# Patient Record
Sex: MALE | Race: WHITE
[De-identification: names, ages, dates, MRNs, and addresses within clinical notes are randomized per-mention and may not be internally consistent; named-entity substitution may affect disease eponyms.]

---

## 2018-01-13 ENCOUNTER — HOSPITAL ENCOUNTER (EMERGENCY)
Dept: HOSPITAL 25 - UCEAST | Age: 48
Discharge: HOME | End: 2018-01-13
Payer: COMMERCIAL

## 2018-01-13 VITALS — SYSTOLIC BLOOD PRESSURE: 156 MMHG | DIASTOLIC BLOOD PRESSURE: 91 MMHG

## 2018-01-13 DIAGNOSIS — I10: ICD-10-CM

## 2018-01-13 DIAGNOSIS — M25.561: Primary | ICD-10-CM

## 2018-01-13 DIAGNOSIS — R29.898: ICD-10-CM

## 2018-01-13 DIAGNOSIS — F17.210: ICD-10-CM

## 2018-01-13 DIAGNOSIS — M25.461: ICD-10-CM

## 2018-01-13 PROCEDURE — 99212 OFFICE O/P EST SF 10 MIN: CPT

## 2018-01-13 PROCEDURE — G0463 HOSPITAL OUTPT CLINIC VISIT: HCPCS

## 2018-01-13 NOTE — RAD
Indication: Right knee pain.



4 views of the right knee demonstrates no fracture. There are mild degenerative changes of

both the medial and lateral compartment. There is suggestion of a suprapatellar effusion.



IMPRESSION: Suprapatellar effusion with degenerative changes of both knees.

## 2018-01-13 NOTE — UC
Deepak GUTIERRES Nilda, scribed for Massimo Parker MD on 01/13/18 at 1906 .





Knee Pain HPI





- HPI Summary


HPI Summary: 


This patient is a 47 year old M presenting to [facility] with a chief complaint of 

constant right knee pain since twisting it while sleeping last night. The 

patient rates the aching pain 8/10 in severity. Symptoms aggravated by walking 

and bending. Symptoms alleviated by rest and ibuprofen. Patient reports right 

calf pain and decreased knee ROM secondary to pain. He states that he 

occasionally feels like knee may give out on him. Pt notes that when he keeps 

his leg straight, he occasionally feels pain travel to right foot.  Two weeks 

ago pt twisted right knee after getting out of bed. No PMHx blood clots. PSHx 

surgery on his left knee.





- History of Current Complaint


Chief Complaint: UCLowerExtremity


Stated Complaint: KNEE INJURY


Time Seen by Provider: 01/13/18 18:30


Hx Obtained From: Patient


Onset/Duration: Sudden Onset, Lasting Days, Still Present


Severity Currently: Severe


Location Of Injury: right knee


Pain Intensity: 8


Pain Scale Used: 0-10 Numeric


Character: Aching


Aggravating Factor(s): Movement, Other - bending


Alleviating Factor(s): Rest, OTC Meds - ibuprofen





- Allergies/Home Medications


Allergies/Adverse Reactions: 


 Allergies











Allergy/AdvReac Type Severity Reaction Status Date / Time


 


CI Pigment Blue 63 Allergy Severe swelling/it Verified 01/13/18 17:59





[From Dexilant]   nichole  


 


Dexlansoprazole Allergy Severe swelling/it Verified 01/13/18 17:59





[From Dexilant]   nichole  











Home Medications: 


 Home Medications





Metformin HCl [Fortamet]  01/13/18 [History]


Metoprolol Succinate [Metoprolol Succinate ER]  01/13/18 [History]


Nicotine PATCH 21 MG/24 HR*  01/13/18 [History]











PMH/Surg Hx/FS Hx/Imm Hx





- Additional Past Medical History


Additional PMH: 


Arthritis





Cardiovascular History: Hypertension


GI/ History: Ulcer





- Surgical History


Surgical History: Yes


Surgery Procedure, Year, and Place: TRIPLE HERNIA REPAIR--APRIL 2012 PT HAD 

MESH REACTION.  LEFT KNEE SURGERY NOV 2015





- Family History


Known Family History: Positive: Cardiac Disease





- Social History


Alcohol Use: None


Alcohol Amount: ONCE A MONTH


Substance Use Type: None


Smoking Status (MU): Heavy Every Day Tobacco Smoker


Type: Cigarettes


Amount Used/How Often: one pack x 30 years


Have You Smoked in the Last Year: Yes


Household Exposure Type: Cigarettes





- Immunization History


Most Recent Influenza Vaccination: unknown


Most Recent Tetanus Shot: unsure


Most Recent Pneumonia Vaccination: never





Review of Systems


Skin: Other - psoriasis


Musculoskeletal: Decreased ROM, Other: - right knee pain; right foot pain upon 

extending right leg for too long


All Other Systems Reviewed And Are Negative: Yes





Physical Exam


Triage Information Reviewed: Yes


Vital Signs: 


 Initial Vital Signs











Temp  98.6 F   01/13/18 17:53


 


Pulse  81   01/13/18 17:53


 


Resp  14   01/13/18 17:53


 


BP  156/91   01/13/18 17:53


 


Pulse Ox  98   01/13/18 17:53











Vital Signs Reviewed: Yes





- Additional Comments


General: well-appearing, no pain distress


Skin: warm, color reflects adequate perfusion, dry, severe psoriasis


Head: normal


Eyes: EOMI, DINH


ENT: normal


Neck: supple, nontender


Respiratory: CTA, breath sounds present


Cardiovascular: RRR


Abdomen: soft, nontender


Bowel: present


Musculoskeletal: Right knee effusion stable to exam, Tender over the medial 

aspect of right knee, popliteal, and patella. Not tender laterally. No 

tenderness to calf or upper inner thigh. + Edema bilat.


Neurological: normal, sensory/motor intact, A&O x3


Psychological: affect/mood appropriate





Diagnostics





- Radiology


  ** R Knee XR


Radiology Interpretation Completed By: Radiologist - Right knee XR, per 

radiologist, reveals suprapatellar effusion with degenerative changes of both 

knees. Dr. Parker has reviewed this radiology report.





Re-Evaluation





- Re-Evaluation


  ** First Eval


Re-Evaluation Time: 19:15


Comment: Reviewed imaging results with pt. Pt agreeable to discharge.





Knee Pain Course/Dx





- Course


Course Of Treatment: Knee XR, per radiologist, reveals suprapatellar effusion 

with degenerative changes of both knees. Dr. Parker has reviewed this 

radiology report.  Allergies noted.  Medications reviewed.  BP noted and 

advised to follow up with PCP.  DISCUSSED RESULTS WITH PATIENT.  PATIENT HAS 

CHRONIC EDEMA. WE DISCUSSED THE POSSIBLITY OF DVT; THIS IS UNLIKELY AT THIS 

TIME GIVEN HIS HX AND EXAM. THE PAIN ORIGINATES AT THE SWOLLEN KNEE. HE WILL 

SEEK RE EVAL IF ANY S/SX OF DVT.  F/U PMD; RETURN IF WORSE OR QUESTIONS/

CONCERNS.





- Differential Dx/Diagnosis


Provider Diagnoses: RIGHT KNEE EFFUSION AND PAIN.  uncontrolled hypertension





Discharge





- Discharge Plan


Condition: Stable


Disposition: HOME


Prescriptions: 


HYDROcodone/ACETAMIN 5-325 MG* [Norco 5-325 TAB*] 1 tab PO Q4H PRN #30 tab MDD 6


 PRN Reason: Pain


Patient Education Materials:  Swollen Knee Joint (ED), Knee Pain (ED)


Referrals: 


Chen Mendoza [Primary Care Provider] - 


Additional Instructions: 


FOLLOW UP WITH YOUR DOCTOR.


YOUR PAIN APPEARS TO BE FROM YOUR KNEE INJURY.


WE DID NOT DO AN ULTRASOUND TO EVALUATE YOUR LEG FOR A DEEP VENOUS THROMBOSIS (

DVT). IT DOES NOT APPEAR THAT YOU HAVE A DVT AT THIS TIME HOWEVER, IF YOUR LEG 

WORSENS WITH CALF OR UPPER INNER THIGH SWELLING OR PAIN, GET CHECKED FOR A DVT.


GET RECHECKED FOR ANY WORSENING OF YOUR CONDITION; CALF OR UPPER INNER THIGH 

PAIN, OR QUESTIONS OR CONCERNS. Your blood pressure was elevated during todays 

visit; please follow up with your primary care provider within a week for 

further evaluation. 





The documentation as recorded by the Deepak wright Nilda accurately 

reflects the service I personally performed and the decisions made by me, 

Massimo Parker MD.

## 2019-12-07 ENCOUNTER — HOSPITAL ENCOUNTER (EMERGENCY)
Dept: HOSPITAL 25 - UCCORT | Age: 49
Discharge: HOME HEALTH | End: 2019-12-07
Payer: SELF-PAY

## 2019-12-07 VITALS — SYSTOLIC BLOOD PRESSURE: 131 MMHG | DIASTOLIC BLOOD PRESSURE: 85 MMHG

## 2019-12-07 DIAGNOSIS — L30.8: ICD-10-CM

## 2019-12-07 DIAGNOSIS — M79.662: Primary | ICD-10-CM

## 2019-12-07 DIAGNOSIS — I10: ICD-10-CM

## 2019-12-07 DIAGNOSIS — Z88.8: ICD-10-CM

## 2019-12-07 DIAGNOSIS — R00.0: ICD-10-CM

## 2019-12-07 DIAGNOSIS — F17.210: ICD-10-CM

## 2019-12-07 DIAGNOSIS — E11.9: ICD-10-CM

## 2019-12-07 PROCEDURE — 99212 OFFICE O/P EST SF 10 MIN: CPT

## 2019-12-07 PROCEDURE — G0463 HOSPITAL OUTPT CLINIC VISIT: HCPCS

## 2019-12-07 NOTE — UC
Lower Extremity/Ankle HPI





- History of Current Complaint


Chief Complaint: UCLowerExtremity


Stated Complaint: LEFT CALF COMPLAINT


Time Seen by Provider: 12/07/19 09:31


Pain Intensity: 11





- Allergies/Home Medications


Allergies/Adverse Reactions: 


 Allergies











Allergy/AdvReac Type Severity Reaction Status Date / Time


 


dexlansoprazole Allergy  Itching Verified 11/14/18 13:17





[From Dexilant]   and  





   Swelling  











Home Medications: 


 Home Medications





NK [No Home Medications Reported]  12/07/19 [History Confirmed 12/07/19]











PMH/Surg Hx/FS Hx/Imm Hx





- Surgical History


Surgical History: Yes


Surgery Procedure, Year, and Place: TRIPLE HERNIA REPAIR--APRIL 2012 PT HAD 

MESH REACTION.  LEFT KNEE SURGERY NOV 2015





- Family History


Known Family History: Positive: None, Cardiac Disease





- Social History


Alcohol Use: Occasionally


Alcohol Amount: ONCE A MONTH


Substance Use Type: None


Smoking Status (MU): Heavy Every Day Tobacco Smoker


Type: Cigarettes


Amount Used/How Often: 1 PPD


Length of Time of Smoking/Using Tobacco: Since Age 12


Have You Smoked in the Last Year: Yes


Household Exposure Type: Cigarettes





- Immunization History


Most Recent Influenza Vaccination: unknown


Most Recent Tetanus Shot: unsure


Most Recent Pneumonia Vaccination: never


Vaccination Up to Date: Yes





Physical Exam


Vital Signs: 


 Initial Vital Signs











Temp  98.1 F   12/07/19 08:48


 


Pulse  100   12/07/19 08:48


 


Resp  18   12/07/19 08:48


 


BP  131/85   12/07/19 08:48


 


Pulse Ox  96   12/07/19 08:48














Discharge ED





- Discharge Plan


Referrals: 


Chen Mendoza [Primary Care Provider] -

## 2019-12-07 NOTE — UC
Lower Extremity/Ankle HPI





- HPI Summary


HPI Summary: 





Pt presents with c/o left posterior calf pain worsening with ambulation and 

palpation X 1 week.  Pt has fmh of DVT. Pt is a diabetic. 





- History of Current Complaint


Chief Complaint: UCLowerExtremity


Stated Complaint: LEFT CALF COMPLAINT


Time Seen by Provider: 12/07/19 09:31


Hx Obtained From: Patient


Onset/Duration: Gradual Onset, Lasting Days, Still Present, Worse Since - onset


Pain Intensity: 11


Pain Scale Used: 0-10 Numeric


Aggravating Factor(s): Standing, Ambulation


Alleviating Factor(s): Nothing


Able to Bear Weight: Yes





- Risk Factors


Gout Risk Factors: Age Over 40, Male, Diabetes, Obesity


DVT Risk Factors: Family Hx of Clotting Disorder


Septic Arthritis Risk Factor: Negative





- Allergies/Home Medications


Allergies/Adverse Reactions: 


 Allergies











Allergy/AdvReac Type Severity Reaction Status Date / Time


 


dexlansoprazole Allergy  Itching Verified 11/14/18 13:17





[From Dexilant]   and  





   Swelling  











Home Medications: 


 Home Medications





NK [No Home Medications Reported]  12/07/19 [History Confirmed 12/07/19]











PMH/Surg Hx/FS Hx/Imm Hx


Previously Healthy: Yes


Endocrine History: Diabetes


Cardiovascular History: Cardiac Disease, Hypertension





- Surgical History


Surgical History: Yes


Surgery Procedure, Year, and Place: TRIPLE HERNIA REPAIR--APRIL 2012 PT HAD 

MESH REACTION.  LEFT KNEE SURGERY NOV 2015





- Family History


Known Family History: Positive: None, Cardiac Disease





- Social History


Occupation: Employed Full-time


Lives: Alone


Alcohol Use: Occasionally


Alcohol Amount: ONCE A MONTH


Substance Use Type: None


Smoking Status (MU): Heavy Every Day Tobacco Smoker


Type: Cigarettes


Amount Used/How Often: 1 PPD


Length of Time of Smoking/Using Tobacco: Since Age 12


Have You Smoked in the Last Year: Yes


Household Exposure Type: Cigarettes





- Immunization History


Most Recent Influenza Vaccination: unknown


Most Recent Tetanus Shot: unsure


Most Recent Pneumonia Vaccination: never


Vaccination Up to Date: Yes





Review of Systems


All Other Systems Reviewed And Are Negative: Yes


Constitutional: Positive: Negative


Skin: Positive: Other - venous stasis bilateral, extremely dry skin, bilateral 

lower extremities.


Eyes: Positive: Negative


ENT: Positive: Negative


Respiratory: Positive: Negative


Cardiovascular: Positive: Negative


Gastrointestinal: Positive: Negative


Genitourinary: Positive: Negative


Motor: Positive: Negative


Neurovascular: Positive: Negative


Musculoskeletal: Positive: Edema - left calf, Myalgia - left calf posterior


Neurological: Positive: Negative


Psychological: Positive: Negative


Is Patient Immunocompromised?: No





Physical Exam


Triage Information Reviewed: Yes


Appearance: Obese


Vital Signs: 


 Initial Vital Signs











Temp  98.1 F   12/07/19 08:48


 


Pulse  100   12/07/19 08:48


 


Resp  18   12/07/19 08:48


 


BP  131/85   12/07/19 08:48


 


Pulse Ox  96   12/07/19 08:48











Vital Signs Reviewed: Yes


Eye Exam: Normal


ENT Exam: Normal


Dental: Positive: Gross Decay/Caries @


Neck exam: Normal


Respiratory: Positive: No respiratory distress


Cardiovascular: Positive: Tachycardia


Musculoskeletal: Positive: Other: - posterior left calf pain


Neurological Exam: Normal


Psychological Exam: Normal


Skin Exam: Other - venous stasis bilateral lower extremities, extrmely dry skin

  Pt advised to nicole support socks and to apply unscented lotion such as 

cetaphil to skin daily.





Lower Extremity Course/Dx





- Course


Course Of Treatment: 





Pt advised to go to ER for further testing and evaluation to rule out DVt.  Pt 

verbalized understanding and agreed to plan of care. 





- Differential Dx/Diagnosis


Differential Diagnosis/HQI/PQRI: Cellulitis, DVT, Tendonitis


Provider Diagnosis: 


 Pain of left calf, Dry skin, Venous stasis dermatitis








Discharge ED





- Sign-Out/Discharge


Documenting (check all that apply): Patient Departure


All imaging exams completed and their final reports reviewed: No Studies





- Discharge Plan


Condition: Stable


Disposition: HOME-RECOMMEND TO ED


Patient Education Materials:  Leg Edema (ED)


Forms:  *Work Release


Referrals: 


Chen Mendoza [Primary Care Provider] - As Soon As Possible


Additional Instructions: 


It is recommended that you go directly to the closest emergency room for 

further testing and evaluation of your left lower leg pain and swelling. Please 

follow up with your PCP as soon as possible. For your dry skin, please use a 

lotion such as Cetaphil or it's generic equivalent to hydrate your skin. 





- Billing Disposition and Condition


Condition: STABLE


Disposition: Home-Recommend to ED